# Patient Record
Sex: FEMALE | Race: WHITE | NOT HISPANIC OR LATINO
[De-identification: names, ages, dates, MRNs, and addresses within clinical notes are randomized per-mention and may not be internally consistent; named-entity substitution may affect disease eponyms.]

---

## 2017-01-12 PROBLEM — Z00.00 ENCOUNTER FOR PREVENTIVE HEALTH EXAMINATION: Status: ACTIVE | Noted: 2017-01-12

## 2017-02-17 ENCOUNTER — APPOINTMENT (OUTPATIENT)
Dept: GERIATRICS | Facility: CLINIC | Age: 77
End: 2017-02-17

## 2017-12-26 ENCOUNTER — APPOINTMENT (OUTPATIENT)
Dept: ENDOCRINOLOGY | Facility: CLINIC | Age: 77
End: 2017-12-26

## 2019-01-25 ENCOUNTER — APPOINTMENT (OUTPATIENT)
Dept: ORTHOPEDIC SURGERY | Facility: CLINIC | Age: 79
End: 2019-01-25
Payer: MEDICARE

## 2019-01-25 VITALS — HEIGHT: 65.5 IN | WEIGHT: 190 LBS | BODY MASS INDEX: 31.27 KG/M2

## 2019-01-25 DIAGNOSIS — M54.10 RADICULOPATHY, SITE UNSPECIFIED: ICD-10-CM

## 2019-01-25 DIAGNOSIS — M16.12 UNILATERAL PRIMARY OSTEOARTHRITIS, LEFT HIP: ICD-10-CM

## 2019-01-25 PROCEDURE — 72100 X-RAY EXAM L-S SPINE 2/3 VWS: CPT

## 2019-01-25 PROCEDURE — 73502 X-RAY EXAM HIP UNI 2-3 VIEWS: CPT | Mod: LT

## 2019-01-25 PROCEDURE — 99205 OFFICE O/P NEW HI 60 MIN: CPT

## 2019-01-28 PROBLEM — M16.12 ARTHRITIS OF LEFT HIP: Status: ACTIVE | Noted: 2019-01-28

## 2019-05-13 ENCOUNTER — OUTPATIENT (OUTPATIENT)
Dept: OUTPATIENT SERVICES | Facility: HOSPITAL | Age: 79
LOS: 1 days | End: 2019-05-13
Payer: MEDICARE

## 2019-05-13 VITALS
HEART RATE: 78 BPM | HEIGHT: 66 IN | OXYGEN SATURATION: 97 % | RESPIRATION RATE: 16 BRPM | DIASTOLIC BLOOD PRESSURE: 86 MMHG | SYSTOLIC BLOOD PRESSURE: 146 MMHG | WEIGHT: 197.98 LBS | TEMPERATURE: 98 F

## 2019-05-13 DIAGNOSIS — E03.9 HYPOTHYROIDISM, UNSPECIFIED: ICD-10-CM

## 2019-05-13 DIAGNOSIS — S89.90XA UNSPECIFIED INJURY OF UNSPECIFIED LOWER LEG, INITIAL ENCOUNTER: Chronic | ICD-10-CM

## 2019-05-13 DIAGNOSIS — Z98.890 OTHER SPECIFIED POSTPROCEDURAL STATES: Chronic | ICD-10-CM

## 2019-05-13 DIAGNOSIS — Z01.818 ENCOUNTER FOR OTHER PREPROCEDURAL EXAMINATION: ICD-10-CM

## 2019-05-13 DIAGNOSIS — M19.90 UNSPECIFIED OSTEOARTHRITIS, UNSPECIFIED SITE: ICD-10-CM

## 2019-05-13 DIAGNOSIS — Z29.9 ENCOUNTER FOR PROPHYLACTIC MEASURES, UNSPECIFIED: ICD-10-CM

## 2019-05-13 DIAGNOSIS — M16.12 UNILATERAL PRIMARY OSTEOARTHRITIS, LEFT HIP: ICD-10-CM

## 2019-05-13 DIAGNOSIS — Z86.79 PERSONAL HISTORY OF OTHER DISEASES OF THE CIRCULATORY SYSTEM: ICD-10-CM

## 2019-05-13 DIAGNOSIS — I10 ESSENTIAL (PRIMARY) HYPERTENSION: ICD-10-CM

## 2019-05-13 DIAGNOSIS — Z87.81 PERSONAL HISTORY OF (HEALED) TRAUMATIC FRACTURE: Chronic | ICD-10-CM

## 2019-05-13 DIAGNOSIS — I48.91 UNSPECIFIED ATRIAL FIBRILLATION: ICD-10-CM

## 2019-05-13 LAB
BLD GP AB SCN SERPL QL: NEGATIVE — SIGNIFICANT CHANGE UP
HBA1C BLD-MCNC: 5.8 % — HIGH (ref 4–5.6)
RH IG SCN BLD-IMP: NEGATIVE — SIGNIFICANT CHANGE UP

## 2019-05-13 PROCEDURE — G0463: CPT

## 2019-05-13 PROCEDURE — 87640 STAPH A DNA AMP PROBE: CPT

## 2019-05-13 PROCEDURE — 86850 RBC ANTIBODY SCREEN: CPT

## 2019-05-13 PROCEDURE — 87641 MR-STAPH DNA AMP PROBE: CPT

## 2019-05-13 PROCEDURE — 86900 BLOOD TYPING SEROLOGIC ABO: CPT

## 2019-05-13 PROCEDURE — 86901 BLOOD TYPING SEROLOGIC RH(D): CPT

## 2019-05-13 PROCEDURE — 83036 HEMOGLOBIN GLYCOSYLATED A1C: CPT

## 2019-05-13 RX ORDER — LIDOCAINE HCL 20 MG/ML
0.2 VIAL (ML) INJECTION ONCE
Refills: 0 | Status: DISCONTINUED | OUTPATIENT
Start: 2019-05-28 | End: 2019-05-28

## 2019-05-13 RX ORDER — CEFAZOLIN SODIUM 1 G
2000 VIAL (EA) INJECTION ONCE
Refills: 0 | Status: DISCONTINUED | OUTPATIENT
Start: 2019-05-28 | End: 2019-05-28

## 2019-05-13 RX ORDER — SODIUM CHLORIDE 9 MG/ML
3 INJECTION INTRAMUSCULAR; INTRAVENOUS; SUBCUTANEOUS EVERY 8 HOURS
Refills: 0 | Status: DISCONTINUED | OUTPATIENT
Start: 2019-05-28 | End: 2019-05-28

## 2019-05-13 NOTE — H&P PST ADULT - NSICDXPASTSURGICALHX_GEN_ALL_CORE_FT
PAST SURGICAL HISTORY:  H/O thyroidectomy 1963, nodules i had, not cancer"    History of wrist fracture right ORIF    Knee injury h/o right knee injury, accidental , had knee surgery/ vein repair,   30 years ago

## 2019-05-13 NOTE — H&P PST ADULT - PRO TOBACCO TYPE
1963, was a social smoker/cigarettes quit in 1963, was a social smoker for couple of years/cigarettes

## 2019-05-13 NOTE — H&P PST ADULT - HISTORY OF PRESENT ILLNESS
left leg pain 78 year old female with h/o HTN (2008), mild renal artery stenosis(on follow up with cardiologist), atrial flutter( 2016),hyperthyroidism, h/o thyroidectomy on synthroid, chronic atrial fibrillation on metoprolol / Eliquis & OA , reports having pain in left hip & difficulty walking for about a year, scheduled for left anterior total hip replacement on 05/28/19. 78 year old female with h/o HTN (2008), mild renal artery stenosis (on follow up with cardiologist), atrial flutter( 2016), hyperthyroidism- s/p thyroidectomy, chronic atrial fibrillation on metoprolol / Eliquis & OA , reports having pain in left hip & difficulty walking for about a year, scheduled for left anterior total hip replacement on 05/28/19.

## 2019-05-13 NOTE — H&P PST ADULT - NSICDXPROBLEM_GEN_ALL_CORE_FT
PROBLEM DIAGNOSES  Problem: OA (osteoarthritis)  Assessment and Plan: left anterior total hip replacement    Problem: Afib  Assessment and Plan: Instructed to continue meds &  take with sips of water in AM the day of surgery   Off Eliquis 3 days(last dose 05/24/19) prior to surgery & as per cardiologist advice/ Redcap Recommendation    Problem: Prophylactic measure  Assessment and Plan: The Caprini score indicates that this patient is at high risk for a VTE event (score 6 or greater). Surgical patients in this group will benefit from both pharmacologic prophylaxis and intermittent compression devices.  The surgical team will determine the balance between VTE risk and bleeding risk, and other clinical considerations     Problem: H/O renal artery stenosis  Assessment and Plan: as per patient she is on follow up with cardiologist & had an abdominal sonogram ? recently.    Problem: Hypothyroidism  Assessment and Plan: Instructed to continue meds &  take with sips of water in AM the day of surgery PROBLEM DIAGNOSES  Problem: OA (osteoarthritis)  Assessment and Plan: left anterior total hip replacement    Problem: Afib  Assessment and Plan: Instructed to continue meds &  take with sips of water in AM the day of surgery   Off Eliquis 3 days(last dose 05/24/19) prior to surgery & as per cardiologist advice/ Redcap Recommendation # [id number]    Problem: Prophylactic measure  Assessment and Plan: The Caprini score indicates that this patient is at high risk for a VTE event (score 6 or greater). Surgical patients in this group will benefit from both pharmacologic prophylaxis and intermittent compression devices.  The surgical team will determine the balance between VTE risk and bleeding risk, and other clinical considerations     Problem: H/O renal artery stenosis  Assessment and Plan: as per patient she is on follow up with cardiologist & had an abdominal sonogram ? recently.    Problem: Hypothyroidism  Assessment and Plan: Instructed to continue meds &  take with sips of water in AM the day of surgery

## 2019-05-13 NOTE — H&P PST ADULT - MUSCULOSKELETAL
details… detailed exam no joint swelling/no joint erythema/no calf tenderness/no joint warmth/normal strength

## 2019-05-13 NOTE — H&P PST ADULT - AIRWAY
normal normal/"h/o TMJ problem once about >25 years ago, had problem to close the mouth during dental cane & I dried to move my mouth differently & closed self"

## 2019-05-13 NOTE — H&P PST ADULT - NEUROLOGICAL DETAILS
sensation intact/normal strength/responds to pain/responds to verbal commands/alert and oriented x 3/cranial nerves intact

## 2019-05-13 NOTE — H&P PST ADULT - NSANTHOSAYNRD_GEN_A_CORE
No. CARITO screening performed.  STOP BANG Legend: 0-2 = LOW Risk; 3-4 = INTERMEDIATE Risk; 5-8 = HIGH Risk

## 2019-05-13 NOTE — H&P PST ADULT - RS GEN PE MLT RESP DETAILS PC
no rales/clear to auscultation bilaterally/breath sounds equal/no wheezes/no rhonchi/respirations non-labored

## 2019-05-13 NOTE — H&P PST ADULT - NSICDXPASTMEDICALHX_GEN_ALL_CORE_FT
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    H/O hyperthyroidism nodules, benign"    H/O paroxysmal atrial tachycardia h/o atrial flutter started 12 yeras ago    H/O renal artery stenosis h/o htn    Head concussion 2000, everything was fine, no foolow uo required"    HTN (hypertension) 2008    OA (osteoarthritis) PAST MEDICAL HISTORY:  H/O atrial flutter paroxysmal  leading to chronic permanent afib 2016 02/2016 likely due to hyperparathyroidism    H/O hyperthyroidism nodules, benign"    H/O paroxysmal atrial tachycardia h/o atrial flutter started 12 years ago    H/O renal artery stenosis h/o htn    Head concussion 2000, everything was fine, no follow up required"    HTN (hypertension) 2008    OA (osteoarthritis)

## 2019-05-13 NOTE — H&P PST ADULT - ASSESSMENT
BETHI VTE 2.0 SCORE [CLOT updated 2019]    AGE RELATED RISK FACTORS                                                       MOBILITY RELATED FACTORS  [ ] Age 41-60 years                                            (1 Point)                    [ ] Bed rest                                                        (1 Point)  [ ] Age: 61-74 years                                           (2 Points)                  [ ] Plaster cast                                                   (2 Points)  [ x] Age= 75 years                                              (3 Points)                    [ ] Bed bound for more than 72 hours                 (2 Points)    DISEASE RELATED RISK FACTORS                                               GENDER SPECIFIC FACTORS  [x ] Edema in the lower extremities                       (1 Point)              [ ] Pregnancy                                                     (1 Point)  [ ] Varicose veins                                               (1 Point)                     [ ] Post-partum < 6 weeks                                   (1 Point)             [ x] BMI > 25 Kg/m2                                            (1 Point)                     [ ] Hormonal therapy  or oral contraception          (1 Point)                 [ ] Sepsis (in the previous month)                        (1 Point)               [ ] History of pregnancy complications                 (1 point)  [ ] Pneumonia or serious lung disease                                               [ ] Unexplained or recurrent                     (1 Point)           (in the previous month)                               (1 Point)  [ ] Abnormal pulmonary function test                     (1 Point)                 SURGERY RELATED RISK FACTORS  [ ] Acute myocardial infarction                              (1 Point)               [ ]  Section                                             (1 Point)  [ ] Congestive heart failure (in the previous month)  (1 Point)      [ ] Minor surgery                                                  (1 Point)   [ ] Inflammatory bowel disease                             (1 Point)               [x ] Arthroscopic surgery                                        (2 Points)  [ ] Central venous access                                      (2 Points)                [x ] General surgery lasting more than 45 minutes (2 points)  [ ] Malignancy- Present or previous                   (2 Points)                [ ] Elective arthroplasty                                         (5 points)    [ ] Stroke (in the previous month)                          (5 Points)                                                                                                                                                           HEMATOLOGY RELATED FACTORS                                                 TRAUMA RELATED RISK FACTORS  [ ] Prior episodes of VTE                                     (3 Points)                [ ] Fracture of the hip, pelvis, or leg                       (5 Points)  [ ] Positive family history for VTE                         (3 Points)             [ ] Acute spinal cord injury (in the previous month)  (5 Points)  [ ] Prothrombin 91024 A                                     (3 Points)               [ ] Paralysis  (less than 1 month)                             (5 Points)  [ ] Factor V Leiden                                             (3 Points)                  [ ] Multiple Trauma within 1 month                        (5 Points)  [ ] Lupus anticoagulants                                     (3 Points)                                                           [ ] Anticardiolipin antibodies                               (3 Points)                                                       [ ] High homocysteine in the blood                      (3 Points)                                             [ ] Other congenital or acquired thrombophilia      (3 Points)                                                [ ] Heparin induced thrombocytopenia                  (3 Points)                                     Total Score [ 12 ]

## 2019-05-14 LAB
MRSA PCR RESULT.: SIGNIFICANT CHANGE UP
S AUREUS DNA NOSE QL NAA+PROBE: SIGNIFICANT CHANGE UP

## 2019-05-28 ENCOUNTER — TRANSCRIPTION ENCOUNTER (OUTPATIENT)
Age: 79
End: 2019-05-28

## 2019-05-28 ENCOUNTER — INPATIENT (INPATIENT)
Facility: HOSPITAL | Age: 79
LOS: 0 days | Discharge: HOME | DRG: 554 | End: 2019-05-28
Attending: ORTHOPAEDIC SURGERY | Admitting: ORTHOPAEDIC SURGERY
Payer: MEDICARE

## 2019-05-28 ENCOUNTER — APPOINTMENT (OUTPATIENT)
Dept: ORTHOPEDIC SURGERY | Facility: HOSPITAL | Age: 79
End: 2019-05-28

## 2019-05-28 VITALS — SYSTOLIC BLOOD PRESSURE: 175 MMHG | DIASTOLIC BLOOD PRESSURE: 124 MMHG

## 2019-05-28 VITALS — DIASTOLIC BLOOD PRESSURE: 75 MMHG | HEART RATE: 80 BPM | SYSTOLIC BLOOD PRESSURE: 142 MMHG

## 2019-05-28 DIAGNOSIS — M16.12 UNILATERAL PRIMARY OSTEOARTHRITIS, LEFT HIP: ICD-10-CM

## 2019-05-28 DIAGNOSIS — Z87.81 PERSONAL HISTORY OF (HEALED) TRAUMATIC FRACTURE: Chronic | ICD-10-CM

## 2019-05-28 DIAGNOSIS — Z98.890 OTHER SPECIFIED POSTPROCEDURAL STATES: Chronic | ICD-10-CM

## 2019-05-28 DIAGNOSIS — S89.90XA UNSPECIFIED INJURY OF UNSPECIFIED LOWER LEG, INITIAL ENCOUNTER: Chronic | ICD-10-CM

## 2019-05-28 LAB
APTT BLD: 28.3 SEC — SIGNIFICANT CHANGE UP (ref 27.5–36.3)
GLUCOSE BLDC GLUCOMTR-MCNC: 111 MG/DL — HIGH (ref 70–99)
INR BLD: 1.05 RATIO — SIGNIFICANT CHANGE UP (ref 0.88–1.16)
PROTHROM AB SERPL-ACNC: 12.1 SEC — SIGNIFICANT CHANGE UP (ref 10–12.9)
RH IG SCN BLD-IMP: NEGATIVE — SIGNIFICANT CHANGE UP

## 2019-05-28 PROCEDURE — 85610 PROTHROMBIN TIME: CPT

## 2019-05-28 PROCEDURE — 86901 BLOOD TYPING SEROLOGIC RH(D): CPT

## 2019-05-28 PROCEDURE — 85730 THROMBOPLASTIN TIME PARTIAL: CPT

## 2019-05-28 PROCEDURE — 82962 GLUCOSE BLOOD TEST: CPT

## 2019-05-28 PROCEDURE — 86900 BLOOD TYPING SEROLOGIC ABO: CPT

## 2019-05-28 RX ORDER — PANTOPRAZOLE SODIUM 20 MG/1
40 TABLET, DELAYED RELEASE ORAL ONCE
Refills: 0 | Status: COMPLETED | OUTPATIENT
Start: 2019-05-28 | End: 2019-05-28

## 2019-05-28 RX ORDER — GABAPENTIN 400 MG/1
100 CAPSULE ORAL ONCE
Refills: 0 | Status: COMPLETED | OUTPATIENT
Start: 2019-05-28 | End: 2019-05-28

## 2019-05-28 RX ORDER — AMLODIPINE BESYLATE 2.5 MG/1
10 TABLET ORAL ONCE
Refills: 0 | Status: COMPLETED | OUTPATIENT
Start: 2019-05-28 | End: 2019-05-28

## 2019-05-28 RX ORDER — CHLORHEXIDINE GLUCONATE 213 G/1000ML
1 SOLUTION TOPICAL ONCE
Refills: 0 | Status: COMPLETED | OUTPATIENT
Start: 2019-05-28 | End: 2019-05-28

## 2019-05-28 RX ORDER — AMLODIPINE BESYLATE 2.5 MG/1
1 TABLET ORAL
Qty: 30 | Refills: 0
Start: 2019-05-28 | End: 2019-06-26

## 2019-05-28 RX ORDER — ACETAMINOPHEN 500 MG
1000 TABLET ORAL ONCE
Refills: 0 | Status: COMPLETED | OUTPATIENT
Start: 2019-05-28 | End: 2019-05-28

## 2019-05-28 RX ORDER — TRAMADOL HYDROCHLORIDE 50 MG/1
50 TABLET ORAL ONCE
Refills: 0 | Status: DISCONTINUED | OUTPATIENT
Start: 2019-05-28 | End: 2019-05-28

## 2019-05-28 RX ADMIN — AMLODIPINE BESYLATE 10 MILLIGRAM(S): 2.5 TABLET ORAL at 13:47

## 2019-05-28 RX ADMIN — CHLORHEXIDINE GLUCONATE 1 APPLICATION(S): 213 SOLUTION TOPICAL at 09:40

## 2019-05-28 RX ADMIN — PANTOPRAZOLE SODIUM 40 MILLIGRAM(S): 20 TABLET, DELAYED RELEASE ORAL at 09:28

## 2019-05-28 RX ADMIN — GABAPENTIN 100 MILLIGRAM(S): 400 CAPSULE ORAL at 09:28

## 2019-05-28 RX ADMIN — Medication 1000 MILLIGRAM(S): at 09:28

## 2019-05-28 RX ADMIN — TRAMADOL HYDROCHLORIDE 50 MILLIGRAM(S): 50 TABLET ORAL at 10:23

## 2019-05-28 NOTE — PROGRESS NOTE ADULT - SUBJECTIVE AND OBJECTIVE BOX
pt seen in SDA. chart and cv note reviewed. sbp 160-180's/dbp 120-130's over 2 hours. pt states other providers have remarked that bp is high this past month but does not know values. denies pain or anxiety. case cancelled for bp optimization - pt advised to seek medical care in hospital

## 2019-05-28 NOTE — DISCHARGE NOTE PROVIDER - NSDCFUADDINST_GEN_ALL_CORE_FT
Amlodpine script sent to [facility]. Take starting TOMORROW 5/29.  Follow up with your Primary care doctor within the week.

## 2019-05-28 NOTE — CONSULT NOTE ADULT - SUBJECTIVE AND OBJECTIVE BOX
The patient was seen for hypertension after O.R. was cancelled for left anterior hip replacement because of a BP of 180/120. The patient, when seen by me was asymptomatic in atrial fibrillation, with a moderate ventricular response. Manual BP taken by me was 160/95. She was given 10 mg amlodipine and BP became 140/80. She was discharged to home on her baseline medications and 10 mg amlodipine daily. Evaluation of renal artery stenosis was to be performed by Dr Steven Goldberg, her cardiologist, prior to rescheduling of elective orthopedic  surgery. Comprehensive consultation dictated # [id number]. The patient was seen for hypertension after O.R. was cancelled for left anterior hip replacement because of a BP of 180/120. The patient, when seen by me was asymptomatic in atrial fibrillation, with a moderate ventricular response. Manual BP taken by me was 160/95. She was given 10 mg amlodipine and BP became 140/80. She was discharged to home on her baseline medications and 10 mg amlodipine daily. Evaluation of renal artery stenosis was to be performed by Dr Steven Goldberg, her cardiologist, prior to rescheduling of elective orthopedic  surgery. Comprehensive consultation dictated # [id number].    CONSULTING SERVICE:  Medicine.    REASON FOR CONSULTATION:  Left anterior total hip replacement by Dr. Son 05/28/2019 and surgery cancelled.  The patient treated and discharged on the same day.    HISTORY OF PRESENT ILLNESS:  The patient is a 78-year-old female with a history of hypertension.  She was diagnosed with mild renal artery stenosis approximately 10 years ago and has been followed by Dr. Steven Goldberg. Cardiologist.  She has had paroxysmal atrial fib flutter and is chronically in atrial fibrillation.  She is rate controlled with metoprolol and she is anticoagulated with Eliquis.  The patient has advanced osteoarthritis and was scheduled for a left anterior total hip replacement today with Dr. Son.  Preoperatively, she was having blood pressures from 160-180 systolic over 100-120 diastolic.  She took her 100 mg of Toprol-XL several hours prior to the surgery.  She was seen by Anesthesia and her blood pressure was noted to be 180/120 and the patient was observed.  Her blood pressure continued to remain elevated and surgery was cancelled with the intent of better blood pressure control and delaying the operation for up to for weeks.  I was called in consultation to see the patient for blood pressure control.  At this time, the blood pressure manually taken by me was 160/95, electronically it was 180/120.  The patient was placed on telemetry for an irregular heart and she was noted to be in atrial fibrillation with a moderate ventricular response, heart rate is 70-90.  The patient was otherwise asymptomatic and feeling well.    Laboratories were reviewed and the patient had a creatinine of 1.34.  EKG showed no ST-T wave changes.  I telephoned to Dr. Steven Goldberg Cardiologist to discuss the situation and then chose to give the patient 10 mg of amlodipine while being observed in the same day admission region.  Blood pressure was monitored for the next hour and remained 160/90 and the patient remained asymptomatic during this time.  Blood pressure recordings that were next noted was 140/75 and repeat 139/78.  The patient was advised to continue 10 mg of amlodipine daily in addition to her 100 mg of Toprol-XL twice a day.  She was advised to resume Eliquis for chronic atrial fibrillation.  She was advised to have a followup appointment with Dr. Steven Goldberg in one week on her new medications and we had discussed MRA study of the renal arteries to rule out a primary renal artery stenosis that would be amendable to angioplasty.  ACE inhibitors were to be avoided in the phase of renal artery stenosis fear that they may cause sudden drop in blood pressure.  The patient was otherwise medically stable and given comprehensive discharge instructions regarding diagnosis, medications, activity, diet and followup care.  At this time, she was medically stable and discharged to home to be followed by Dr. Steven Goldberg Cardiologist.        _______________________    DICT:	DANIAL COPELAND MD ([id number]) 05/28/2019 07:24 PM  TRANS:	[id number] 05/28/2019 07:29 PM  JOB:	[id number]    Electronically Signed by:  DANIAL COPELAND 05/29/2019 06:32:47 AM

## 2019-05-28 NOTE — PROVIDER CONTACT NOTE (OTHER) - BACKGROUND
Patient has a history of HTN, Renal Artery Stenosis, PATs AFlutter, and AFIB. Patient took Metorprolol 100 this am, blood pressure still elevated.

## 2019-05-28 NOTE — DISCHARGE NOTE PROVIDER - NSDCCPCAREPLAN_GEN_ALL_CORE_FT
PRINCIPAL DISCHARGE DIAGNOSIS  Diagnosis: OA (osteoarthritis)  Assessment and Plan of Treatment: Patient scheduled for [facility], however case was canceled due to uncontrolled HTN.   Evaluated in the ED by Medicine. Given x1 Amlodipine in preop and being discharged home with Amlodipine to take daily starting tomorrow. Patient should follow up with this week and surgery will be rescheduled for next month. Patient can restart home meds, including Eliquis.

## 2019-05-28 NOTE — DISCHARGE NOTE PROVIDER - HOSPITAL COURSE
78 year old female with h/o HTN (2008), mild renal artery stenosis (on follow up with cardiologist), atrial flutter( 2016), hyperthyroidism- s/p thyroidectomy, chronic atrial fibrillation on metoprolol / Eliquis & OA , scheduled for left anterior total hip replacement on 05/28/19 w/ Dr. Son.        In pre-op patient was hypertensive sbp 160-180's/dbp 120-130's. Case was canceled due to uncontrolled HTN. Pt was evaluated by medicine Dr. Howe who contact patients PCP. Patient was given 10mg Amlodipine and is being discharge with a a presciption of 10mg Amlodipine daily. Pt will follow up with her PCP and her surgery is to be rescheduled for next month. Patient is being discharged home in stable condition.

## 2019-05-28 NOTE — DISCHARGE NOTE PROVIDER - CARE PROVIDER_API CALL
Lizandro Son)  Orthopaedic Surgery  [address], [address]  [place] [address]  Phone: [phone]  Fax: [phone]  Follow Up Time: 2 weeks

## 2019-05-28 NOTE — PROVIDER CONTACT NOTE (OTHER) - ASSESSMENT
900 am - VS- BP (R arm)- 175/124), BP (L arm) 163/112. HR - 95. Temp 36.7 O2- 96%.   Latest BP 1130 am - taken manually, 180/120. Patient asymptomatic. Denies SOB, CP, or any discomfort at this time.

## 2019-06-20 PROBLEM — Z86.79 PERSONAL HISTORY OF OTHER DISEASES OF THE CIRCULATORY SYSTEM: Chronic | Status: ACTIVE | Noted: 2019-05-13

## 2019-06-20 PROBLEM — Z86.39 PERSONAL HISTORY OF OTHER ENDOCRINE, NUTRITIONAL AND METABOLIC DISEASE: Chronic | Status: ACTIVE | Noted: 2019-05-13

## 2019-06-20 PROBLEM — I10 ESSENTIAL (PRIMARY) HYPERTENSION: Chronic | Status: ACTIVE | Noted: 2019-05-13

## 2019-06-20 PROBLEM — M19.90 UNSPECIFIED OSTEOARTHRITIS, UNSPECIFIED SITE: Chronic | Status: ACTIVE | Noted: 2019-05-13

## 2019-06-20 PROBLEM — S06.0X9A CONCUSSION WITH LOSS OF CONSCIOUSNESS OF UNSPECIFIED DURATION, INITIAL ENCOUNTER: Chronic | Status: ACTIVE | Noted: 2019-05-13

## 2019-07-16 ENCOUNTER — OUTPATIENT (OUTPATIENT)
Dept: OUTPATIENT SERVICES | Facility: HOSPITAL | Age: 79
LOS: 1 days | End: 2019-07-16
Payer: MEDICARE

## 2019-07-16 VITALS
HEIGHT: 64 IN | OXYGEN SATURATION: 95 % | SYSTOLIC BLOOD PRESSURE: 146 MMHG | RESPIRATION RATE: 18 BRPM | WEIGHT: 195.99 LBS | HEART RATE: 87 BPM | TEMPERATURE: 98 F | DIASTOLIC BLOOD PRESSURE: 98 MMHG

## 2019-07-16 DIAGNOSIS — S89.90XA UNSPECIFIED INJURY OF UNSPECIFIED LOWER LEG, INITIAL ENCOUNTER: Chronic | ICD-10-CM

## 2019-07-16 DIAGNOSIS — I48.91 UNSPECIFIED ATRIAL FIBRILLATION: ICD-10-CM

## 2019-07-16 DIAGNOSIS — Z98.890 OTHER SPECIFIED POSTPROCEDURAL STATES: Chronic | ICD-10-CM

## 2019-07-16 DIAGNOSIS — M19.90 UNSPECIFIED OSTEOARTHRITIS, UNSPECIFIED SITE: ICD-10-CM

## 2019-07-16 DIAGNOSIS — M16.12 UNILATERAL PRIMARY OSTEOARTHRITIS, LEFT HIP: ICD-10-CM

## 2019-07-16 DIAGNOSIS — Z87.81 PERSONAL HISTORY OF (HEALED) TRAUMATIC FRACTURE: Chronic | ICD-10-CM

## 2019-07-16 LAB
ANION GAP SERPL CALC-SCNC: 16 MMOL/L — SIGNIFICANT CHANGE UP (ref 5–17)
BLD GP AB SCN SERPL QL: NEGATIVE — SIGNIFICANT CHANGE UP
BUN SERPL-MCNC: 38 MG/DL — HIGH (ref 7–23)
CALCIUM SERPL-MCNC: 9.1 MG/DL — SIGNIFICANT CHANGE UP (ref 8.4–10.5)
CHLORIDE SERPL-SCNC: 101 MMOL/L — SIGNIFICANT CHANGE UP (ref 96–108)
CO2 SERPL-SCNC: 21 MMOL/L — LOW (ref 22–31)
CREAT SERPL-MCNC: 1.84 MG/DL — HIGH (ref 0.5–1.3)
GLUCOSE SERPL-MCNC: 93 MG/DL — SIGNIFICANT CHANGE UP (ref 70–99)
HBA1C BLD-MCNC: 5.8 % — HIGH (ref 4–5.6)
HCT VFR BLD CALC: 41.2 % — SIGNIFICANT CHANGE UP (ref 34.5–45)
HGB BLD-MCNC: 13.2 G/DL — SIGNIFICANT CHANGE UP (ref 11.5–15.5)
MCHC RBC-ENTMCNC: 29.3 PG — SIGNIFICANT CHANGE UP (ref 27–34)
MCHC RBC-ENTMCNC: 32 GM/DL — SIGNIFICANT CHANGE UP (ref 32–36)
MCV RBC AUTO: 91.4 FL — SIGNIFICANT CHANGE UP (ref 80–100)
PLATELET # BLD AUTO: 205 K/UL — SIGNIFICANT CHANGE UP (ref 150–400)
POTASSIUM SERPL-MCNC: 4.8 MMOL/L — SIGNIFICANT CHANGE UP (ref 3.5–5.3)
POTASSIUM SERPL-SCNC: 4.8 MMOL/L — SIGNIFICANT CHANGE UP (ref 3.5–5.3)
RBC # BLD: 4.51 M/UL — SIGNIFICANT CHANGE UP (ref 3.8–5.2)
RBC # FLD: 14 % — SIGNIFICANT CHANGE UP (ref 10.3–14.5)
RH IG SCN BLD-IMP: NEGATIVE — SIGNIFICANT CHANGE UP
SODIUM SERPL-SCNC: 138 MMOL/L — SIGNIFICANT CHANGE UP (ref 135–145)
WBC # BLD: 8.5 K/UL — SIGNIFICANT CHANGE UP (ref 3.8–10.5)
WBC # FLD AUTO: 8.5 K/UL — SIGNIFICANT CHANGE UP (ref 3.8–10.5)

## 2019-07-16 PROCEDURE — 85027 COMPLETE CBC AUTOMATED: CPT

## 2019-07-16 PROCEDURE — 86901 BLOOD TYPING SEROLOGIC RH(D): CPT

## 2019-07-16 PROCEDURE — 80048 BASIC METABOLIC PNL TOTAL CA: CPT

## 2019-07-16 PROCEDURE — 86900 BLOOD TYPING SEROLOGIC ABO: CPT

## 2019-07-16 PROCEDURE — 87640 STAPH A DNA AMP PROBE: CPT

## 2019-07-16 PROCEDURE — 83036 HEMOGLOBIN GLYCOSYLATED A1C: CPT

## 2019-07-16 PROCEDURE — G0463: CPT

## 2019-07-16 PROCEDURE — 86850 RBC ANTIBODY SCREEN: CPT

## 2019-07-16 RX ORDER — SODIUM CHLORIDE 9 MG/ML
3 INJECTION INTRAMUSCULAR; INTRAVENOUS; SUBCUTANEOUS EVERY 8 HOURS
Refills: 0 | Status: DISCONTINUED | OUTPATIENT
Start: 2019-07-27 | End: 2019-07-28

## 2019-07-16 RX ORDER — CEFAZOLIN SODIUM 1 G
2000 VIAL (EA) INJECTION ONCE
Refills: 0 | Status: DISCONTINUED | OUTPATIENT
Start: 2019-07-27 | End: 2019-07-28

## 2019-07-16 RX ORDER — CHLORHEXIDINE GLUCONATE 213 G/1000ML
1 SOLUTION TOPICAL ONCE
Refills: 0 | Status: DISCONTINUED | OUTPATIENT
Start: 2019-07-27 | End: 2019-07-28

## 2019-07-16 NOTE — H&P PST ADULT - NSICDXPROBLEM_GEN_ALL_CORE_FT
PROBLEM DIAGNOSES  Problem: Osteoarthritis  Assessment and Plan: Left anterior total hip replacement    Problem: Atrial fibrillation  Assessment and Plan: Instructed to hold Eliquis 3 days prior to surgery

## 2019-07-16 NOTE — H&P PST ADULT - NSICDXPASTMEDICALHX_GEN_ALL_CORE_FT
PAST MEDICAL HISTORY:  H/O atrial flutter paroxysmal  leading to chronic permanent afib 2016 02/2016 likely due to hyperparathyroidism    H/O hyperthyroidism nodules, benign"    H/O paroxysmal atrial tachycardia h/o atrial flutter started 12 years ago    H/O renal artery stenosis h/o htn    Head concussion 2000, everything was fine, no follow up required"    HTN (hypertension) 2008    OA (osteoarthritis)

## 2019-07-16 NOTE — H&P PST ADULT - VISION (WITH CORRECTIVE LENSES IF THE PATIENT USUALLY WEARS THEM):
Detail Level: Zone Normal vision: sees adequately in most situations; can see medication labels, newsprint

## 2019-07-16 NOTE — H&P PST ADULT - ANESTHESIA, PREVIOUS REACTION, PROFILE
had rapid heart beat with vaginal delivery in 1963, unclear whether related to epidural , no other issues with subsequent surgeries

## 2019-07-16 NOTE — H&P PST ADULT - HISTORY OF PRESENT ILLNESS
78 y/o F PMH renal artery stenosis under observation, no intervention, A fib/flutter on Eliquis, c/o left hip pain for the past year getting progressively worse, was previously scheduled for left anterior total hip replacement in May; however, had elevated blood pressure in the OR and case was aborted.  The patient saw her cardiologist and had medications adjusted, went off the Norvasc for the past 4 days due to leg edema, but continued Losartan.  Instructed patient to contact her cardiologist for alternative mediation today since blood pressure is not controlled on Losartan and Metoprolol alone.  Scheduled for left anterior total hip replacement 7/27/19.

## 2019-07-16 NOTE — H&P PST ADULT - ASSESSMENT
BETHI VTE 2.0 SCORE [CLOT updated 2019]    AGE RELATED RISK FACTORS                                                       MOBILITY RELATED FACTORS  [ ] Age 41-60 years                                            (1 Point)                    [ ] Bed rest                                                        (1 Point)  [ ] Age: 61-74 years                                           (2 Points)                  [ ] Plaster cast                                                   (2 Points)  [ x] Age= 75 years                                              (3 Points)                    [ ] Bed bound for more than 72 hours                 (2 Points)    DISEASE RELATED RISK FACTORS                                               GENDER SPECIFIC FACTORS  [x ] Edema in the lower extremities                       (1 Point)              [ ] Pregnancy                                                     (1 Point)  [ ] Varicose veins                                               (1 Point)                     [ ] Post-partum < 6 weeks                                   (1 Point)             [x ] BMI > 25 Kg/m2                                            (1 Point)                     [ ] Hormonal therapy  or oral contraception          (1 Point)                 [ ] Sepsis (in the previous month)                        (1 Point)               [ ] History of pregnancy complications                 (1 point)  [ ] Pneumonia or serious lung disease                                               [ ] Unexplained or recurrent                     (1 Point)           (in the previous month)                               (1 Point)  [ ] Abnormal pulmonary function test                     (1 Point)                 SURGERY RELATED RISK FACTORS  [ ] Acute myocardial infarction                              (1 Point)               [ ]  Section                                             (1 Point)  [ ] Congestive heart failure (in the previous month)  (1 Point)      [ ] Minor surgery                                                  (1 Point)   [ ] Inflammatory bowel disease                             (1 Point)               [ ] Arthroscopic surgery                                        (2 Points)  [ ] Central venous access                                      (2 Points)                [ x] General surgery lasting more than 45 minutes (2 points)  [ ] Malignancy- Present or previous                   (2 Points)                [ ] Elective arthroplasty                                         (5 points)    [ ] Stroke (in the previous month)                          (5 Points)                                                                                                                                                           HEMATOLOGY RELATED FACTORS                                                 TRAUMA RELATED RISK FACTORS  [ ] Prior episodes of VTE                                     (3 Points)                [ ] Fracture of the hip, pelvis, or leg                       (5 Points)  [ ] Positive family history for VTE                         (3 Points)             [ ] Acute spinal cord injury (in the previous month)  (5 Points)  [ ] Prothrombin 41030 A                                     (3 Points)               [ ] Paralysis  (less than 1 month)                             (5 Points)  [ ] Factor V Leiden                                             (3 Points)                  [ ] Multiple Trauma within 1 month                        (5 Points)  [ ] Lupus anticoagulants                                     (3 Points)                                                           [ ] Anticardiolipin antibodies                               (3 Points)                                                       [ ] High homocysteine in the blood                      (3 Points)                                             [ ] Other congenital or acquired thrombophilia      (3 Points)                                                [ ] Heparin induced thrombocytopenia                  (3 Points)                                     Total Score [   7       ]

## 2019-07-17 LAB
MRSA PCR RESULT.: SIGNIFICANT CHANGE UP
S AUREUS DNA NOSE QL NAA+PROBE: SIGNIFICANT CHANGE UP

## 2019-07-26 ENCOUNTER — TRANSCRIPTION ENCOUNTER (OUTPATIENT)
Age: 79
End: 2019-07-26

## 2019-07-27 ENCOUNTER — INPATIENT (INPATIENT)
Facility: HOSPITAL | Age: 79
LOS: 0 days | Discharge: HOME | DRG: 470 | End: 2019-07-28
Attending: ORTHOPAEDIC SURGERY | Admitting: ORTHOPAEDIC SURGERY
Payer: MEDICARE

## 2019-07-27 ENCOUNTER — APPOINTMENT (OUTPATIENT)
Dept: ORTHOPEDIC SURGERY | Facility: HOSPITAL | Age: 79
End: 2019-07-27

## 2019-07-27 VITALS
RESPIRATION RATE: 18 BRPM | HEART RATE: 90 BPM | WEIGHT: 195.99 LBS | TEMPERATURE: 98 F | DIASTOLIC BLOOD PRESSURE: 101 MMHG | HEIGHT: 64 IN | OXYGEN SATURATION: 97 % | SYSTOLIC BLOOD PRESSURE: 105 MMHG

## 2019-07-27 DIAGNOSIS — Z98.890 OTHER SPECIFIED POSTPROCEDURAL STATES: Chronic | ICD-10-CM

## 2019-07-27 DIAGNOSIS — Z87.81 PERSONAL HISTORY OF (HEALED) TRAUMATIC FRACTURE: Chronic | ICD-10-CM

## 2019-07-27 DIAGNOSIS — S89.90XA UNSPECIFIED INJURY OF UNSPECIFIED LOWER LEG, INITIAL ENCOUNTER: Chronic | ICD-10-CM

## 2019-07-27 DIAGNOSIS — M16.12 UNILATERAL PRIMARY OSTEOARTHRITIS, LEFT HIP: ICD-10-CM

## 2019-07-27 LAB — GLUCOSE BLDC GLUCOMTR-MCNC: 111 MG/DL — HIGH (ref 70–99)

## 2019-07-27 PROCEDURE — 72170 X-RAY EXAM OF PELVIS: CPT | Mod: 26

## 2019-07-27 PROCEDURE — 27130 TOTAL HIP ARTHROPLASTY: CPT | Mod: LT

## 2019-07-27 RX ORDER — CEFAZOLIN SODIUM 1 G
2000 VIAL (EA) INJECTION EVERY 8 HOURS
Refills: 0 | Status: COMPLETED | OUTPATIENT
Start: 2019-07-27 | End: 2019-07-28

## 2019-07-27 RX ORDER — FERROUS SULFATE 325(65) MG
325 TABLET ORAL DAILY
Refills: 0 | Status: DISCONTINUED | OUTPATIENT
Start: 2019-07-27 | End: 2019-07-28

## 2019-07-27 RX ORDER — ACETAMINOPHEN 500 MG
1000 TABLET ORAL ONCE
Refills: 0 | Status: COMPLETED | OUTPATIENT
Start: 2019-07-27 | End: 2019-07-27

## 2019-07-27 RX ORDER — ONDANSETRON 8 MG/1
4 TABLET, FILM COATED ORAL EVERY 6 HOURS
Refills: 0 | Status: DISCONTINUED | OUTPATIENT
Start: 2019-07-27 | End: 2019-07-28

## 2019-07-27 RX ORDER — DEXAMETHASONE 0.5 MG/5ML
8 ELIXIR ORAL ONCE
Refills: 0 | Status: COMPLETED | OUTPATIENT
Start: 2019-07-28 | End: 2019-07-28

## 2019-07-27 RX ORDER — METOPROLOL TARTRATE 50 MG
1 TABLET ORAL
Qty: 0 | Refills: 0 | DISCHARGE

## 2019-07-27 RX ORDER — DOCUSATE SODIUM 100 MG
100 CAPSULE ORAL THREE TIMES A DAY
Refills: 0 | Status: DISCONTINUED | OUTPATIENT
Start: 2019-07-27 | End: 2019-07-28

## 2019-07-27 RX ORDER — PANTOPRAZOLE SODIUM 20 MG/1
40 TABLET, DELAYED RELEASE ORAL
Refills: 0 | Status: DISCONTINUED | OUTPATIENT
Start: 2019-07-27 | End: 2019-07-28

## 2019-07-27 RX ORDER — AMLODIPINE BESYLATE 2.5 MG/1
10 TABLET ORAL DAILY
Refills: 0 | Status: DISCONTINUED | OUTPATIENT
Start: 2019-07-28 | End: 2019-07-28

## 2019-07-27 RX ORDER — KETOROLAC TROMETHAMINE 30 MG/ML
15 SYRINGE (ML) INJECTION EVERY 8 HOURS
Refills: 0 | Status: DISCONTINUED | OUTPATIENT
Start: 2019-07-27 | End: 2019-07-28

## 2019-07-27 RX ORDER — SODIUM CHLORIDE 9 MG/ML
500 INJECTION INTRAMUSCULAR; INTRAVENOUS; SUBCUTANEOUS ONCE
Refills: 0 | Status: COMPLETED | OUTPATIENT
Start: 2019-07-27 | End: 2019-07-27

## 2019-07-27 RX ORDER — SODIUM CHLORIDE 9 MG/ML
1000 INJECTION, SOLUTION INTRAVENOUS
Refills: 0 | Status: DISCONTINUED | OUTPATIENT
Start: 2019-07-27 | End: 2019-07-28

## 2019-07-27 RX ORDER — PANTOPRAZOLE SODIUM 20 MG/1
40 TABLET, DELAYED RELEASE ORAL ONCE
Refills: 0 | Status: COMPLETED | OUTPATIENT
Start: 2019-07-27 | End: 2019-07-27

## 2019-07-27 RX ORDER — MAGNESIUM HYDROXIDE 400 MG/1
30 TABLET, CHEWABLE ORAL DAILY
Refills: 0 | Status: DISCONTINUED | OUTPATIENT
Start: 2019-07-27 | End: 2019-07-28

## 2019-07-27 RX ORDER — TRAMADOL HYDROCHLORIDE 50 MG/1
50 TABLET ORAL EVERY 6 HOURS
Refills: 0 | Status: DISCONTINUED | OUTPATIENT
Start: 2019-07-27 | End: 2019-07-28

## 2019-07-27 RX ORDER — METOPROLOL TARTRATE 50 MG
100 TABLET ORAL DAILY
Refills: 0 | Status: DISCONTINUED | OUTPATIENT
Start: 2019-07-28 | End: 2019-07-28

## 2019-07-27 RX ORDER — SENNA PLUS 8.6 MG/1
2 TABLET ORAL AT BEDTIME
Refills: 0 | Status: DISCONTINUED | OUTPATIENT
Start: 2019-07-27 | End: 2019-07-28

## 2019-07-27 RX ORDER — OXYCODONE HYDROCHLORIDE 5 MG/1
10 TABLET ORAL EVERY 4 HOURS
Refills: 0 | Status: DISCONTINUED | OUTPATIENT
Start: 2019-07-27 | End: 2019-07-28

## 2019-07-27 RX ORDER — ACETAMINOPHEN 500 MG
975 TABLET ORAL ONCE
Refills: 0 | Status: COMPLETED | OUTPATIENT
Start: 2019-07-27 | End: 2019-07-27

## 2019-07-27 RX ORDER — TRAMADOL HYDROCHLORIDE 50 MG/1
50 TABLET ORAL ONCE
Refills: 0 | Status: DISCONTINUED | OUTPATIENT
Start: 2019-07-27 | End: 2019-07-27

## 2019-07-27 RX ORDER — MORPHINE SULFATE 50 MG/1
2 CAPSULE, EXTENDED RELEASE ORAL EVERY 4 HOURS
Refills: 0 | Status: DISCONTINUED | OUTPATIENT
Start: 2019-07-27 | End: 2019-07-28

## 2019-07-27 RX ORDER — APIXABAN 2.5 MG/1
5 TABLET, FILM COATED ORAL EVERY 12 HOURS
Refills: 0 | Status: DISCONTINUED | OUTPATIENT
Start: 2019-07-28 | End: 2019-07-28

## 2019-07-27 RX ORDER — SODIUM CHLORIDE 9 MG/ML
500 INJECTION INTRAMUSCULAR; INTRAVENOUS; SUBCUTANEOUS ONCE
Refills: 0 | Status: COMPLETED | OUTPATIENT
Start: 2019-07-28 | End: 2019-07-28

## 2019-07-27 RX ORDER — APIXABAN 2.5 MG/1
1 TABLET, FILM COATED ORAL
Qty: 0 | Refills: 0 | DISCHARGE

## 2019-07-27 RX ORDER — LOSARTAN POTASSIUM 100 MG/1
12.5 TABLET, FILM COATED ORAL
Qty: 0 | Refills: 0 | DISCHARGE

## 2019-07-27 RX ORDER — LEVOTHYROXINE SODIUM 125 MCG
175 TABLET ORAL DAILY
Refills: 0 | Status: DISCONTINUED | OUTPATIENT
Start: 2019-07-27 | End: 2019-07-28

## 2019-07-27 RX ORDER — POLYETHYLENE GLYCOL 3350 17 G/17G
17 POWDER, FOR SOLUTION ORAL DAILY
Refills: 0 | Status: DISCONTINUED | OUTPATIENT
Start: 2019-07-27 | End: 2019-07-28

## 2019-07-27 RX ORDER — ACETAMINOPHEN 500 MG
975 TABLET ORAL EVERY 8 HOURS
Refills: 0 | Status: DISCONTINUED | OUTPATIENT
Start: 2019-07-28 | End: 2019-07-28

## 2019-07-27 RX ORDER — CELECOXIB 200 MG/1
200 CAPSULE ORAL EVERY 12 HOURS
Refills: 0 | Status: CANCELLED | OUTPATIENT
Start: 2019-07-29 | End: 2019-07-28

## 2019-07-27 RX ORDER — OXYCODONE HYDROCHLORIDE 5 MG/1
5 TABLET ORAL EVERY 4 HOURS
Refills: 0 | Status: DISCONTINUED | OUTPATIENT
Start: 2019-07-27 | End: 2019-07-28

## 2019-07-27 RX ORDER — LEVOTHYROXINE SODIUM 125 MCG
1 TABLET ORAL
Qty: 0 | Refills: 0 | DISCHARGE

## 2019-07-27 RX ORDER — LOSARTAN POTASSIUM 100 MG/1
12.5 TABLET, FILM COATED ORAL DAILY
Refills: 0 | Status: DISCONTINUED | OUTPATIENT
Start: 2019-07-28 | End: 2019-07-28

## 2019-07-27 RX ADMIN — Medication 1000 MILLIGRAM(S): at 16:07

## 2019-07-27 RX ADMIN — Medication 15 MILLIGRAM(S): at 15:36

## 2019-07-27 RX ADMIN — Medication 100 MILLIGRAM(S): at 18:58

## 2019-07-27 RX ADMIN — SODIUM CHLORIDE 1000 MILLILITER(S): 9 INJECTION INTRAMUSCULAR; INTRAVENOUS; SUBCUTANEOUS at 15:00

## 2019-07-27 RX ADMIN — Medication 400 MILLIGRAM(S): at 21:43

## 2019-07-27 RX ADMIN — Medication 15 MILLIGRAM(S): at 16:07

## 2019-07-27 RX ADMIN — SODIUM CHLORIDE 100 MILLILITER(S): 9 INJECTION, SOLUTION INTRAVENOUS at 12:53

## 2019-07-27 RX ADMIN — Medication 400 MILLIGRAM(S): at 15:37

## 2019-07-27 RX ADMIN — Medication 1000 MILLIGRAM(S): at 22:15

## 2019-07-27 RX ADMIN — PANTOPRAZOLE SODIUM 40 MILLIGRAM(S): 20 TABLET, DELAYED RELEASE ORAL at 06:59

## 2019-07-27 RX ADMIN — Medication 100 MILLIGRAM(S): at 15:36

## 2019-07-27 RX ADMIN — Medication 975 MILLIGRAM(S): at 06:59

## 2019-07-27 RX ADMIN — SODIUM CHLORIDE 1000 MILLILITER(S): 9 INJECTION INTRAMUSCULAR; INTRAVENOUS; SUBCUTANEOUS at 12:52

## 2019-07-27 RX ADMIN — SODIUM CHLORIDE 3 MILLILITER(S): 9 INJECTION INTRAMUSCULAR; INTRAVENOUS; SUBCUTANEOUS at 21:44

## 2019-07-27 RX ADMIN — Medication 15 MILLIGRAM(S): at 22:15

## 2019-07-27 RX ADMIN — TRAMADOL HYDROCHLORIDE 50 MILLIGRAM(S): 50 TABLET ORAL at 07:36

## 2019-07-27 RX ADMIN — Medication 15 MILLIGRAM(S): at 21:44

## 2019-07-27 NOTE — PHYSICAL THERAPY INITIAL EVALUATION ADULT - CRITERIA FOR SKILLED THERAPEUTIC INTERVENTIONS
predicted duration of therapy intervention/impairments found/therapy frequency/anticipated discharge recommendation/risk reduction/prevention/rehab potential/anticipated equipment needs at discharge/functional limitations in following categories

## 2019-07-27 NOTE — PRE-ANESTHESIA EVALUATION ADULT - NSATTENDATTESTRD_GEN_ALL_CORE
Will review results with pt at 12/19/17 appt  1. LFTS continue to increase in Hep C patient.  Previously discussed treatment options but patient has not followed through
The patient has been re-examined and I agree with the above assessment or I updated with my findings.

## 2019-07-27 NOTE — PHYSICAL THERAPY INITIAL EVALUATION ADULT - PLANNED THERAPY INTERVENTIONS, PT EVAL
Pt will negotiate 1 flight of stairs indepenedently in 2 weeks./balance training/bed mobility training/gait training/strengthening/transfer training

## 2019-07-27 NOTE — PHYSICAL THERAPY INITIAL EVALUATION ADULT - ADDITIONAL COMMENTS
as per pt: PTA pt was living in a PH + Stairs ( has 4 steps to enter + flight of stairs to bedroom) has hand rails  and was independent in all functional mobility and ADL's. no AD for gait.

## 2019-07-27 NOTE — PROGRESS NOTE ADULT - SUBJECTIVE AND OBJECTIVE BOX
POST OPERATIVE NOTE      80 Y/O FM S/P LEFT TOTAL HIP ARTHROPLASTY (ANTERIOR APPROACH)  Resting without complaints.  No Chest Pain, SOB, N/V    T(C): 36.6 (07-27-19 @ 14:52), Max: 36.6 (07-27-19 @ 14:52)  HR: 99 (07-27-19 @ 14:52) (82 - 99)  BP: 135/86 (07-27-19 @ 14:52) (105/101 - 155/83)  RR: 18 (07-27-19 @ 14:52) (15 - 18)  SpO2: 95% (07-27-19 @ 14:52) (95% - 99%)    Exam:  Alert and [place], No Acute Distress  Card: +S1/S2, RRR  Pulm: CTAB  Laterality: Left L/E, N/V intact, moving all digits, sensation intact, Aquacel in place, CDI  Calves soft, non-tender bilaterally  +PF/DF/EHL/FHL  SILT  + DP pulses    Xray:revals hardware in satisfactory position

## 2019-07-27 NOTE — PHYSICAL THERAPY INITIAL EVALUATION ADULT - PERTINENT HX OF CURRENT PROBLEM, REHAB EVAL
80 y/o F PMH renal artery stenosis under observation, no intervention, A fib/flutter on Eliquis, c/o left hip pain for the past year getting progressively worse, for left anterior total hip replacement 7/27/19. originally had OR  planned however aborted 2/2 elevated BP> now BP controlled and s/p L DURAN

## 2019-07-28 ENCOUNTER — TRANSCRIPTION ENCOUNTER (OUTPATIENT)
Age: 79
End: 2019-07-28

## 2019-07-28 VITALS — DIASTOLIC BLOOD PRESSURE: 85 MMHG | SYSTOLIC BLOOD PRESSURE: 147 MMHG | HEART RATE: 95 BPM

## 2019-07-28 LAB
ANION GAP SERPL CALC-SCNC: 14 MMOL/L — SIGNIFICANT CHANGE UP (ref 5–17)
BUN SERPL-MCNC: 34 MG/DL — HIGH (ref 7–23)
CALCIUM SERPL-MCNC: 8.6 MG/DL — SIGNIFICANT CHANGE UP (ref 8.4–10.5)
CHLORIDE SERPL-SCNC: 101 MMOL/L — SIGNIFICANT CHANGE UP (ref 96–108)
CO2 SERPL-SCNC: 19 MMOL/L — LOW (ref 22–31)
CREAT SERPL-MCNC: 1.93 MG/DL — HIGH (ref 0.5–1.3)
GLUCOSE SERPL-MCNC: 194 MG/DL — HIGH (ref 70–99)
HCT VFR BLD CALC: 31.2 % — LOW (ref 34.5–45)
HGB BLD-MCNC: 10.8 G/DL — LOW (ref 11.5–15.5)
MCHC RBC-ENTMCNC: 31.5 PG — SIGNIFICANT CHANGE UP (ref 27–34)
MCHC RBC-ENTMCNC: 34.7 GM/DL — SIGNIFICANT CHANGE UP (ref 32–36)
MCV RBC AUTO: 90.5 FL — SIGNIFICANT CHANGE UP (ref 80–100)
PLATELET # BLD AUTO: 183 K/UL — SIGNIFICANT CHANGE UP (ref 150–400)
POTASSIUM SERPL-MCNC: 5.1 MMOL/L — SIGNIFICANT CHANGE UP (ref 3.5–5.3)
POTASSIUM SERPL-SCNC: 5.1 MMOL/L — SIGNIFICANT CHANGE UP (ref 3.5–5.3)
RBC # BLD: 3.45 M/UL — LOW (ref 3.8–5.2)
RBC # FLD: 12.9 % — SIGNIFICANT CHANGE UP (ref 10.3–14.5)
SODIUM SERPL-SCNC: 134 MMOL/L — LOW (ref 135–145)
WBC # BLD: 10.8 K/UL — HIGH (ref 3.8–10.5)
WBC # FLD AUTO: 10.8 K/UL — HIGH (ref 3.8–10.5)

## 2019-07-28 PROCEDURE — C1776: CPT

## 2019-07-28 PROCEDURE — 80048 BASIC METABOLIC PNL TOTAL CA: CPT

## 2019-07-28 PROCEDURE — 76000 FLUOROSCOPY <1 HR PHYS/QHP: CPT

## 2019-07-28 PROCEDURE — 72170 X-RAY EXAM OF PELVIS: CPT

## 2019-07-28 PROCEDURE — 97161 PT EVAL LOW COMPLEX 20 MIN: CPT

## 2019-07-28 PROCEDURE — 82962 GLUCOSE BLOOD TEST: CPT

## 2019-07-28 PROCEDURE — 97110 THERAPEUTIC EXERCISES: CPT

## 2019-07-28 PROCEDURE — C1713: CPT

## 2019-07-28 PROCEDURE — 97165 OT EVAL LOW COMPLEX 30 MIN: CPT

## 2019-07-28 PROCEDURE — 85027 COMPLETE CBC AUTOMATED: CPT

## 2019-07-28 PROCEDURE — 97116 GAIT TRAINING THERAPY: CPT

## 2019-07-28 RX ORDER — POLYETHYLENE GLYCOL 3350 17 G/17G
17 POWDER, FOR SOLUTION ORAL
Qty: 0 | Refills: 0 | DISCHARGE
Start: 2019-07-28

## 2019-07-28 RX ORDER — METOPROLOL TARTRATE 50 MG
100 TABLET ORAL DAILY
Refills: 0 | Status: DISCONTINUED | OUTPATIENT
Start: 2019-07-28 | End: 2019-07-28

## 2019-07-28 RX ORDER — ACETAMINOPHEN 500 MG
3 TABLET ORAL
Qty: 0 | Refills: 0 | DISCHARGE
Start: 2019-07-28

## 2019-07-28 RX ORDER — AMLODIPINE BESYLATE 2.5 MG/1
5 TABLET ORAL DAILY
Refills: 0 | Status: DISCONTINUED | OUTPATIENT
Start: 2019-07-28 | End: 2019-07-28

## 2019-07-28 RX ORDER — AMLODIPINE BESYLATE 2.5 MG/1
10 TABLET ORAL DAILY
Refills: 0 | Status: DISCONTINUED | OUTPATIENT
Start: 2019-07-28 | End: 2019-07-28

## 2019-07-28 RX ORDER — CELECOXIB 200 MG/1
1 CAPSULE ORAL
Qty: 60 | Refills: 0
Start: 2019-07-28 | End: 2019-08-26

## 2019-07-28 RX ORDER — OXYCODONE HYDROCHLORIDE 5 MG/1
1 TABLET ORAL
Qty: 42 | Refills: 0
Start: 2019-07-28 | End: 2019-08-03

## 2019-07-28 RX ORDER — DOCUSATE SODIUM 100 MG
1 CAPSULE ORAL
Qty: 0 | Refills: 0 | DISCHARGE
Start: 2019-07-28

## 2019-07-28 RX ORDER — TRAMADOL HYDROCHLORIDE 50 MG/1
1 TABLET ORAL
Qty: 28 | Refills: 0
Start: 2019-07-28 | End: 2019-08-03

## 2019-07-28 RX ORDER — PANTOPRAZOLE SODIUM 20 MG/1
1 TABLET, DELAYED RELEASE ORAL
Qty: 30 | Refills: 0
Start: 2019-07-28 | End: 2019-08-26

## 2019-07-28 RX ADMIN — APIXABAN 5 MILLIGRAM(S): 2.5 TABLET, FILM COATED ORAL at 06:17

## 2019-07-28 RX ADMIN — PANTOPRAZOLE SODIUM 40 MILLIGRAM(S): 20 TABLET, DELAYED RELEASE ORAL at 06:13

## 2019-07-28 RX ADMIN — AMLODIPINE BESYLATE 5 MILLIGRAM(S): 2.5 TABLET ORAL at 09:12

## 2019-07-28 RX ADMIN — SODIUM CHLORIDE 3 MILLILITER(S): 9 INJECTION INTRAMUSCULAR; INTRAVENOUS; SUBCUTANEOUS at 05:47

## 2019-07-28 RX ADMIN — Medication 15 MILLIGRAM(S): at 06:43

## 2019-07-28 RX ADMIN — Medication 100 MILLIGRAM(S): at 06:13

## 2019-07-28 RX ADMIN — Medication 975 MILLIGRAM(S): at 06:43

## 2019-07-28 RX ADMIN — Medication 975 MILLIGRAM(S): at 06:13

## 2019-07-28 RX ADMIN — Medication 101.6 MILLIGRAM(S): at 06:13

## 2019-07-28 RX ADMIN — Medication 15 MILLIGRAM(S): at 06:14

## 2019-07-28 RX ADMIN — Medication 100 MILLIGRAM(S): at 02:52

## 2019-07-28 RX ADMIN — SODIUM CHLORIDE 1000 MILLILITER(S): 9 INJECTION INTRAMUSCULAR; INTRAVENOUS; SUBCUTANEOUS at 09:08

## 2019-07-28 RX ADMIN — LOSARTAN POTASSIUM 12.5 MILLIGRAM(S): 100 TABLET, FILM COATED ORAL at 09:12

## 2019-07-28 NOTE — OCCUPATIONAL THERAPY INITIAL EVALUATION ADULT - ANTICIPATED DISCHARGE DISPOSITION, OT EVAL
to increase independence with ADLs, IADLs, functional mobility and assess safety in the home environment./home w/ OT

## 2019-07-28 NOTE — OCCUPATIONAL THERAPY INITIAL EVALUATION ADULT - PRECAUTIONS/LIMITATIONS, REHAB EVAL
fall precautions/surgical precautions fall precautions/left hip precautions/surgical precautions/anterior hip precautions

## 2019-07-28 NOTE — OCCUPATIONAL THERAPY INITIAL EVALUATION ADULT - ADDITIONAL COMMENTS
X-Ray 7/27/19: Patient is status post left total hip arthroplasty with a noncemented femoral component. Postsurgical changes include soft tissue swelling and subcutaneous air. There is no evidence of prosthetic or periprosthetic fracture. There is severe right hip arthrosis.  Pt s/p L DURAN anterior approach 7/27/19.

## 2019-07-28 NOTE — DISCHARGE NOTE PROVIDER - HOSPITAL COURSE
Reason for Admission    I'm having a left hip replacement.         History of Present Illness:    History of Present Illness        80 y/o F PMH renal artery stenosis under observation, no intervention, A fib/flutter on Eliquis, c/o left hip pain for the past year getting progressively worse, was previously scheduled for left anterior total hip replacement in May; however, had elevated blood pressure in the OR and case was aborted.  The patient saw her cardiologist and had medications adjusted, went off the Norvasc for the past 4 days due to leg edema, but continued Losartan.  Instructed patient to contact her cardiologist for alternative mediation today since blood pressure is not controlled on Losartan and Metoprolol alone.  Scheduled for left anterior total hip replacement 7/27/19.         Past Medical, Past Surgical History:    PAST MEDICAL HISTORY:    H/O atrial flutter paroxysmal  leading to chronic permanent afib 2016 02/2016 likely due to hyperparathyroidism    H/O hyperthyroidism nodules, benign"    H/O paroxysmal atrial tachycardia h/o atrial flutter started 12 years ago    H/O renal artery stenosis h/o htn    Head concussion 2000, everything was fine, no follow up required"    HTN (hypertension) 2008    OA (osteoarthritis).         PAST SURGICAL HISTORY:    H/O thyroidectomy 1963, nodules i had, not cancer"    History of wrist fracture right ORIF    Knee injury h/o right knee injury, accidental , had knee surgery/ vein repair,     30 years ago.        HOSPITAL COURSE;    80 y/o FM underwent Left total hip arthroplasty (anterior approach) on 7/27/19 with Dr.S. Son.  Patient tolerated procedure well.  Patient was evaluated postoperatively by physical and occupational therapists for weight bearing as tolerated ambulation with rolling walker and cleared patient for discharge home.  Patient advised to keep surgical incision/dressing clean and dry, and  follow up with Dr. Son post operative day #13 (8/9/19) for wound check and dressing removal. Reason for Admission    I'm having a left hip replacement.         History of Present Illness:    History of Present Illness        80 y/o F PMH renal artery stenosis under observation, no intervention, A fib/flutter on Eliquis, c/o left hip pain for the past year getting progressively worse, was previously scheduled for left anterior total hip replacement in May; however, had elevated blood pressure in the OR and case was aborted.  The patient saw her cardiologist and had medications adjusted, went off the Norvasc for the past 4 days due to leg edema, but continued Losartan.  Instructed patient to contact her cardiologist for alternative mediation today since blood pressure is not controlled on Losartan and Metoprolol alone.  Scheduled for left anterior total hip replacement 7/27/19.         Past Medical, Past Surgical History:    PAST MEDICAL HISTORY:    H/O atrial flutter paroxysmal  leading to chronic permanent afib 2016 02/2016 likely due to hyperparathyroidism    H/O hyperthyroidism nodules, benign"    H/O paroxysmal atrial tachycardia h/o atrial flutter started 12 years ago    H/O renal artery stenosis h/o htn    Head concussion 2000, everything was fine, no follow up required"    HTN (hypertension) 2008    OA (osteoarthritis).         PAST SURGICAL HISTORY:    H/O thyroidectomy 1963, nodules i had, not cancer"    History of wrist fracture right ORIF    Knee injury h/o right knee injury, accidental , had knee surgery/ vein repair,     30 years ago.        HOSPITAL COURSE;    80 y/o FM underwent Left total hip arthroplasty (anterior approach) on 7/27/19 with Dr.S. Son.  Patient tolerated procedure well.  Patient was evaluated postoperatively by physical and occupational therapists for weight bearing as tolerated ambulation with rolling walker and cleared patient for discharge home with home PT. Patient advised to keep surgical incision/dressing clean and dry, and  follow up with Dr. Son post operative day #13 (8/9/19) for wound check and dressing removal. Reason for Admission    I'm having a left hip replacement.         History of Present Illness:    History of Present Illness        80 y/o F PMH renal artery stenosis under observation, no intervention, A fib/flutter on Eliquis, c/o left hip pain for the past year getting progressively worse, was previously scheduled for left anterior total hip replacement in May; however, had elevated blood pressure in the OR and case was aborted.  The patient saw her cardiologist and had medications adjusted, went off the Norvasc for the past 4 days due to leg edema, but continued Losartan.  Instructed patient to contact her cardiologist for alternative mediation today since blood pressure is not controlled on Losartan and Metoprolol alone.  Scheduled for left anterior total hip replacement 7/27/19.         Past Medical, Past Surgical History:    PAST MEDICAL HISTORY:    H/O atrial flutter paroxysmal  leading to chronic permanent afib 2016 02/2016 likely due to hyperparathyroidism    H/O hyperthyroidism nodules, benign"    H/O paroxysmal atrial tachycardia h/o atrial flutter started 12 years ago    H/O renal artery stenosis h/o htn    Head concussion 2000, everything was fine, no follow up required"    HTN (hypertension) 2008    OA (osteoarthritis).         PAST SURGICAL HISTORY:    H/O thyroidectomy 1963, nodules i had, not cancer"    History of wrist fracture right ORIF    Knee injury h/o right knee injury, accidental , had knee surgery/ vein repair,     30 years ago.        HOSPITAL COURSE;    80 y/o FM underwent Left total hip arthroplasty (anterior approach) on 7/27/19 with Dr.S. Son.  Patient tolerated procedure well.  Patient was evaluated postoperatively by physical and occupational therapists for weight bearing as tolerated ambulation with rolling walker and cleared patient for discharge home with home PT. Patient advised to keep surgical incision/dressing clean and dry, and  follow up with Dr. Son post operative day #13 (8/9/19) for wound check and dressing removal.        I-Stop: This report was requested by: Antonio Mercado | Reference #: [id number]

## 2019-07-28 NOTE — OCCUPATIONAL THERAPY INITIAL EVALUATION ADULT - BALANCE TRAINING, PT EVAL
Goal: Pt will demonstrate G dynamic standing balance in 2 weeks to increase safety for ADL/IADL performance.

## 2019-07-28 NOTE — OCCUPATIONAL THERAPY INITIAL EVALUATION ADULT - PERTINENT HX OF CURRENT PROBLEM, REHAB EVAL
78 y/o F PMH renal artery stenosis under observation, no intervention, A fib/flutter on Eliquis, c/o L hip pain for the past year getting progressively worse, was previously scheduled for L anterior DURAN in May; however, had elevated blood pressure in the OR & case was aborted. Py saw her cardiologist & had medications adjusted, went off the Norvasc for the past 4 days due to leg edema, but continued Losartan. Scheduled for L anterior DURAN 7/27/19.

## 2019-07-28 NOTE — DISCHARGE NOTE PROVIDER - CARE PROVIDER_API CALL
Lizandro Son)  Orthopaedic Surgery  [address], [address]  [place] [address]  Phone: [phone]  Fax: [phone]  Follow Up Time:

## 2019-07-28 NOTE — DISCHARGE NOTE PROVIDER - NSDCFUADDINST_GEN_ALL_CORE_FT
Continue weight bearing as tolerated ambulation with rolling walker. Keep surgical incision/Aquacel dressing clean and dry, follow up with Dr. Son post operative day #13 (8/9/19) for wound check and dressing removal. Follow up with your primary care provider in 1-2 weeks Continue weight bearing as tolerated ambulation with rolling walker. Keep surgical incision/Aquacel dressing clean and dry, follow up with Dr. Son post operative day #13 (8/9/19) for wound check and dressing removal. Follow up with your primary care provider in 1-2 weeks  Continue to take your Eliquis for Blood Clot Prevention.

## 2019-07-28 NOTE — PROGRESS NOTE ADULT - SUBJECTIVE AND OBJECTIVE BOX
Post op Day [1]    Patient resting without complaints.  No chest pain, SOB, N/V.    T(C): 36.7 (07-28-19 @ 04:47), Max: 36.7 (07-28-19 @ 04:47)  HR: 90 (07-28-19 @ 04:47) (82 - 108)  BP: 138/74 (07-28-19 @ 04:47) (98/64 - 155/83)  RR: 18 (07-28-19 @ 04:47) (15 - 18)  SpO2: 95% (07-28-19 @ 04:47) (95% - 99%)      Exam:  Alert and Oriented, No Acute Distress    Lower Extremities: L Hip  Dressing: C/D/I w/ Aquacel  Calves Soft, Non-tender bilaterally  +PF/DF/EHL/FHL  SILT  +DP Pulse               A/p: 79y Female s/p L Total Hip Arthroplasty.  VSS. NAD.    PT/OT-WBAT LLE  F/U AM Labs  IS  DVT PPx: Eliquis   Pain Control  Continue Current Tx.  Dispo Planning    Antonio Mercado PA-C  Team Pager: [phone] Post op Day [1]    Patient resting without complaints.  No chest pain, SOB, N/V.    T(C): 36.7 (07-28-19 @ 04:47), Max: 36.7 (07-28-19 @ 04:47)  HR: 90 (07-28-19 @ 04:47) (82 - 108)  BP: 138/74 (07-28-19 @ 04:47) (98/64 - 155/83)  RR: 18 (07-28-19 @ 04:47) (15 - 18)  SpO2: 95% (07-28-19 @ 04:47) (95% - 99%)      Exam:  Alert and Oriented, No Acute Distress    Lower Extremities: L Hip  Dressing: C/D/I w/ Aquacel  Calves Soft, Non-tender bilaterally  +PF/DF/EHL/FHL  SILT  +DP Pulse

## 2019-07-28 NOTE — OCCUPATIONAL THERAPY INITIAL EVALUATION ADULT - LIVES WITH, PROFILE
Pt lives in a private home, 14 steps to negotiate with a walk in shower. Pt was independent with ADLs, IADLs, functional mobility using a cane prior to admission./spouse

## 2019-07-28 NOTE — DISCHARGE NOTE NURSING/CASE MANAGEMENT/SOCIAL WORK - NSDCDPATPORTLINK_GEN_ALL_CORE
You can access the [facility] Patient Portal, offered by [facility], by registering with the following website: [email or web address]

## 2019-07-28 NOTE — PROGRESS NOTE ADULT - ASSESSMENT
A/P: S/p Total Left hip Arthroplasty. VSS. NAD  -PT/OT-WBAT  -IS  -DVT PPx  -Pain Control  -Continue Current Tx  -Dispo planning  Galilea Clifton PA-C  Orthopaedic Surgery  Team pager [phone]  [facility] [phone]  [phone]
A/p: 79y Female s/p L Total Hip Arthroplasty.  VSS. NAD.    PT/OT-WBAT LLE  F/U AM Labs  IS  DVT PPx: Eliquis   Pain Control  Continue Current Tx.  Dispo Planning    Antonio Mercado PA-C  Team Pager: [phone]

## 2019-08-01 ENCOUNTER — APPOINTMENT (OUTPATIENT)
Dept: ORTHOPEDIC SURGERY | Facility: CLINIC | Age: 79
End: 2019-08-01
Payer: MEDICARE

## 2019-08-01 PROCEDURE — 99024 POSTOP FOLLOW-UP VISIT: CPT

## 2019-08-01 PROCEDURE — 73502 X-RAY EXAM HIP UNI 2-3 VIEWS: CPT | Mod: LT

## 2019-08-01 RX ORDER — APIXABAN 5 MG/1
5 TABLET, FILM COATED ORAL
Qty: 60 | Refills: 0 | Status: ACTIVE | COMMUNITY
Start: 2019-07-04

## 2019-08-01 RX ORDER — LEVOTHYROXINE SODIUM 137 UG/1
137 TABLET ORAL
Qty: 90 | Refills: 0 | Status: ACTIVE | COMMUNITY
Start: 2019-06-19

## 2019-08-01 RX ORDER — OXYCODONE 5 MG/1
5 TABLET ORAL
Qty: 42 | Refills: 0 | Status: ACTIVE | COMMUNITY
Start: 2019-07-28

## 2019-08-01 RX ORDER — PANTOPRAZOLE 40 MG/1
40 TABLET, DELAYED RELEASE ORAL
Qty: 30 | Refills: 0 | Status: ACTIVE | COMMUNITY
Start: 2019-07-28

## 2019-08-01 RX ORDER — TRAMADOL HYDROCHLORIDE 50 MG/1
50 TABLET, COATED ORAL
Qty: 28 | Refills: 0 | Status: ACTIVE | COMMUNITY
Start: 2019-07-28

## 2019-08-01 RX ORDER — CELECOXIB 200 MG/1
200 CAPSULE ORAL
Qty: 60 | Refills: 0 | Status: ACTIVE | COMMUNITY
Start: 2019-07-28

## 2019-08-01 RX ORDER — METOPROLOL SUCCINATE 100 MG/1
100 TABLET, EXTENDED RELEASE ORAL
Qty: 180 | Refills: 0 | Status: ACTIVE | COMMUNITY
Start: 2019-02-28

## 2019-08-01 RX ORDER — LOSARTAN POTASSIUM AND HYDROCHLOROTHIAZIDE 12.5; 5 MG/1; MG/1
50-12.5 TABLET ORAL
Qty: 30 | Refills: 0 | Status: ACTIVE | COMMUNITY
Start: 2019-06-19

## 2019-08-01 RX ORDER — LEVOTHYROXINE SODIUM 125 UG/1
125 TABLET ORAL
Qty: 90 | Refills: 0 | Status: ACTIVE | COMMUNITY
Start: 2019-06-01

## 2019-08-01 RX ORDER — AMLODIPINE BESYLATE 10 MG/1
10 TABLET ORAL
Qty: 30 | Refills: 0 | Status: ACTIVE | COMMUNITY
Start: 2019-05-28

## 2019-08-09 ENCOUNTER — APPOINTMENT (OUTPATIENT)
Dept: ORTHOPEDIC SURGERY | Facility: CLINIC | Age: 79
End: 2019-08-09
Payer: MEDICARE

## 2019-08-09 VITALS — WEIGHT: 190 LBS | HEIGHT: 65.5 IN | BODY MASS INDEX: 31.27 KG/M2

## 2019-08-09 PROCEDURE — 99024 POSTOP FOLLOW-UP VISIT: CPT

## 2019-08-14 NOTE — HISTORY OF PRESENT ILLNESS
[___ Days Post Op] : post op day #[unfilled] [Healed] : healed [Neuro Intact] : an unremarkable neurological exam [Vascular Intact] : ~T peripheral vascular exam normal [Negative Mel's] : maneuvers demonstrated a negative Mel's sign [Doing Well] : is doing well [Steri-Strips Removed & Replaced] : steri-strips removed and replaced [Chills] : no chills [Fever] : no fever [de-identified] : left hip possible DC. Incision clean dry and intact. No redness. No drainage. [de-identified] : 4 status post left hip replacement. Patient is here because there was some dressing was stained. [de-identified] : S/P Left hip replacement DOS:7/27/2019. [de-identified] : 2 weeks status post left hip replacement patient doing well start outpatient physical therapy followup in 3 months

## 2019-11-01 ENCOUNTER — APPOINTMENT (OUTPATIENT)
Dept: ORTHOPEDIC SURGERY | Facility: CLINIC | Age: 79
End: 2019-11-01

## 2019-11-18 ENCOUNTER — FORM ENCOUNTER (OUTPATIENT)
Age: 79
End: 2019-11-18

## 2020-02-19 NOTE — PROVIDER CONTACT NOTE (OTHER) - SITUATION
Patient came from home to have planned left hip surgery. BP elevated on admission and continued to rise. Last BP taken manually 180/120.
no abdominal pain/no nausea/no diarrhea

## 2020-08-06 ENCOUNTER — RX RENEWAL (OUTPATIENT)
Age: 80
End: 2020-08-06

## 2024-01-03 NOTE — H&P PST ADULT - MS GEN HX ROS MEA POS PC
[facility] Cardiology Refill Guideline reviewed.  Medication meets criteria for refill. Melba Rodriguez RN Cardiology January 3, 2024, 11:03 AM      
Last Office Visit: 12/21/23 (Kenzie)  Next Office Visit: TBD  Last Fill Date: 12/7/23  Tejal Worrell LPN Cardiology   1/3/2024 10:14 AM    
arthritis/joint pain

## 2024-01-11 ENCOUNTER — NON-APPOINTMENT (OUTPATIENT)
Age: 84
End: 2024-01-11

## 2024-01-11 ENCOUNTER — APPOINTMENT (OUTPATIENT)
Dept: ORTHOPEDIC SURGERY | Facility: CLINIC | Age: 84
End: 2024-01-11
Payer: MEDICARE

## 2024-01-11 VITALS — BODY MASS INDEX: 31.65 KG/M2 | HEIGHT: 65 IN | WEIGHT: 190 LBS

## 2024-01-11 DIAGNOSIS — M16.11 UNILATERAL PRIMARY OSTEOARTHRITIS, RIGHT HIP: ICD-10-CM

## 2024-01-11 DIAGNOSIS — Z96.642 PRESENCE OF LEFT ARTIFICIAL HIP JOINT: ICD-10-CM

## 2024-01-11 PROCEDURE — 72170 X-RAY EXAM OF PELVIS: CPT

## 2024-01-11 PROCEDURE — 99204 OFFICE O/P NEW MOD 45 MIN: CPT

## 2025-02-28 NOTE — H&P PST ADULT - PSYCHIATRIC
H&P reviewed. After examining the patient I find no changes in the patients condition since the H&P had been written. negative Affect and characteristics of appearance, verbalizations, behaviors are appropriate

## 2025-05-20 NOTE — PRE-OP CHECKLIST - AICD PRESENT
Comments: Patient has tried and failed Dupixent due to eye infections.
Current Dosage: 15mg Daily
Detail Level: Zone
Length Of Therapy (Optional): 2.5 years
Add High Risk Medication Management Associated Diagnosis?: Yes
no